# Patient Record
Sex: FEMALE | Race: WHITE | NOT HISPANIC OR LATINO
[De-identification: names, ages, dates, MRNs, and addresses within clinical notes are randomized per-mention and may not be internally consistent; named-entity substitution may affect disease eponyms.]

---

## 2020-07-01 PROBLEM — Z00.00 ENCOUNTER FOR PREVENTIVE HEALTH EXAMINATION: Status: ACTIVE | Noted: 2020-07-01

## 2020-07-07 ENCOUNTER — APPOINTMENT (OUTPATIENT)
Dept: NEUROLOGY | Facility: CLINIC | Age: 70
End: 2020-07-07
Payer: MEDICARE

## 2020-07-07 PROCEDURE — 99203 OFFICE O/P NEW LOW 30 MIN: CPT | Mod: 95

## 2020-07-07 RX ORDER — DONEPEZIL HYDROCHLORIDE 23 MG/1
TABLET, FILM COATED ORAL
Refills: 0 | Status: ACTIVE | COMMUNITY

## 2020-07-07 NOTE — PHYSICAL EXAM
[FreeTextEntry1] : Physical examination \par General: No acute distress, Awake, Alert.   \par \par Mental status \par Awake, alert, and oriented to person, time.  Language intact, Fund of knowledge intact.  \par 4Q$1, 7Q$1.75, Delayed recall with hints 3/3. \par \par \par VIII:  Decreased hearing, bilaterally. \par \par \par

## 2020-07-07 NOTE — REASON FOR VISIT
[Home] : at home, [unfilled] , at the time of the visit. [Medical Office: ([facility])___] : at the medical office located in  [Verbal consent obtained from patient] : the patient, [unfilled] [Family Member] : family member

## 2020-07-07 NOTE — HISTORY OF PRESENT ILLNESS
[FreeTextEntry1] : History obtained from patient and Niece Penny. \par \par Ms. Saxena is a 70 year old woman with a history of anxiety and depression.  She is being followed by psychiatry who prescribed Zoloft and recently prescribed Seroquel before bed.  The niece notes increase in anxiety especially at night with delusions and hallucinations; therefore, the Seroquel was needed.   Approximately one week ago she saw her psychiatrist who started Aricept. She has not taken her first dose yet. \par \par Ms. Saxena reports some infrequent problems with memory. She reports feeling okay overall. \par \par Her niece notes memory changes that have been a slow progression over the past 6-8 months approximately.  Ms. Saxena lives alone and is able to perform all of her IADLs.  She was never able to keep her own appointments, but this has recentlly worsened.  She misplaces items (i.e. misplacing stamps, or not placing utensils in proper place).  She denies any language difficulties.  She does not drive. She gets daily exercise and walks her dog.  Her house is not noted to be disheveled.\par   \par She has not seen a medical doctor in 40 years. She recently established with a PCP.\par \par The remaining neurological review of systems is negative.  \par \par Social history: No alcohol use. Quit smoking a few years ago.\par Family history: "vascular" issues.

## 2020-07-07 NOTE — ASSESSMENT
[FreeTextEntry1] : Falguni Saxena is a 70 year old woman with memory impairment.\par Differential diagnosis includes mild cognitive impairment, pseudodementia, and frontotemporal dementia. \par \par She was prescribed Donepezil by her psychiatrist.\par Serological workup for reversible causes-Vitamin B12, TSH\par MRI brain w/o to rule out structural lesions.\par Neuropsychological evaluation. \par Follow up in 6 weeks in office if comfortable.\par \par anxiety and depression-continue follow ups with psychiatry.  \par Follow up with PCP for full medical exam and workup.

## 2021-01-12 ENCOUNTER — APPOINTMENT (OUTPATIENT)
Dept: NEUROLOGY | Facility: CLINIC | Age: 71
End: 2021-01-12
Payer: MEDICARE

## 2021-01-12 VITALS — SYSTOLIC BLOOD PRESSURE: 196 MMHG | HEART RATE: 103 BPM | DIASTOLIC BLOOD PRESSURE: 103 MMHG

## 2021-01-12 VITALS — SYSTOLIC BLOOD PRESSURE: 146 MMHG | DIASTOLIC BLOOD PRESSURE: 83 MMHG | HEART RATE: 87 BPM

## 2021-01-12 PROCEDURE — 99215 OFFICE O/P EST HI 40 MIN: CPT

## 2021-01-21 NOTE — PHYSICAL EXAM
[FreeTextEntry1] : Physical examination \par General: No acute distress, Awake, Alert.   \par \par Mental status \par Awake, alert, and oriented to person.  Time (Jan 2020) and place (hospital, unsure which hospital), Normal attention span and concentration, Language intact, Fund of knowledge intact.   \par Delayed recall 1/3. \par \par Cranial Nerves \par II: VFF  \par III, IV, VI: PERRL, EOMI.   \par V: Facial sensation is normal B/L.   \par VII: Facial strength is normal B/L. \par \par \par VIII: Gross hearing is intact.   \par \par IX, X: Palate is midline and elevates symmetrically.   \par XI: Trapezius normal strength.   \par XII: Tongue midline without atrophy or fasciculations. \par \par Motor exam  \par Muscle tone - no evidence of rigidity or resistance in all 4 extremities.  \par No atrophy or fasciculations \par Muscle Strength: arms and legs, proximal and distal flexors and extensors are normal \par \par No UE drift.\par \par Reflexes \par Diffuse hyperreflexia.\par \par Plantars right: mute.   \par Plantars left: mute.   \par \par Absent Ankle Jerks. \par \par Coordination \par Finger to nose: Normal.  \par Heel to shin: Normal.   \par \par Sensory \par Decreased vibration left MM. \par Decreased vibration right great toe. \par Intact sensation to vibration and cold.\par \par Intact cold sensation. \par \par Gait \par Normal including heels, toes, and tandem gait.  \par \par Slow, wide based gait. \par \par

## 2021-01-21 NOTE — HISTORY OF PRESENT ILLNESS
[FreeTextEntry1] : History obtained from patient and Niece Penny.\par \par Ms. Saxena is a 70 year old woman with a history of anxiety and depression, hypertension, hyperlipidemia presenting for a follow up for memory difficulties.\par \par As per her niece she first noted a change two years ago.  One year ago she started having hallucinations, delusions, scared at times, and became frustrated. She currently lives alone. She follows with psychiatry and takes Seroquel 100mg and Zoloft 200mg which helps with symptoms.  She had recurrent symptoms in November.   \par She was COVID negative on 12/6/20. She was hospitalized at [facility] in November 2020. \par \par She had recent hallucinations 8 months ago and her parents also had hallucinations. \par \par Ms. Saxena does note some mild changes with her memory. She exercises daily and walks her dog on a daily basis. \par \par The remaining neurological review of systems is negative. \par \par July 7, 2020\par History obtained from patient and Niece Penny. \par \par Ms. Saxena is a 70 year old woman with a history of anxiety and depression.  She is being followed by psychiatry who prescribed Zoloft and recently prescribed Seroquel before bed.  The niece notes increase in anxiety especially at night with delusions and hallucinations; therefore, the Seroquel was needed.   Approximately one week ago she saw her psychiatrist who started Aricept. She has not taken her first dose yet. \par \par Ms. Saxena reports some infrequent problems with memory. She reports feeling okay overall. \par \par Her niece notes memory changes that have been a slow progression over the past 6-8 months approximately.  Ms. Saxena lives alone and is able to perform all of her IADLs.  She was never able to keep her own appointments, but this has recentlly worsened.  She misplaces items (i.e. misplacing stamps, or not placing utensils in proper place).  She denies any language difficulties.  She does not drive. She gets daily exercise and walks her dog.  Her house is not noted to be disheveled.\par   \par She has not seen a medical doctor in 40 years. She recently established with a PCP.\par \par The remaining neurological review of systems is negative.  \par \par Social history: No alcohol use. Quit smoking a few years ago.\par Family history: "vascular" issues.

## 2021-01-21 NOTE — DATA REVIEWED
[de-identified] : 12/10/20 MRI brain-mild findings of chronic small vessel ischemia/infarction. No MRI evidence of recent infarction. No evidence of recent or old hemorrhage.  [de-identified] : 12/11/20 MRA- mild stenosis at the origins of the internal carotid arteries. \par 12/6/20 TSH 1.95\par

## 2021-01-21 NOTE — CONSULT LETTER
[Dear  ___] : Dear  [unfilled], [Consult Letter:] : I had the pleasure of evaluating your patient, [unfilled]. [Please see my note below.] : Please see my note below. [Consult Closing:] : Thank you very much for allowing me to participate in the care of this patient.  If you have any questions, please do not hesitate to contact me. [Sincerely,] : Sincerely, [DrPravin  ___] : Dr. MINAYA [FreeTextEntry3] : Destinee Castaneda M.D.\par

## 2021-01-21 NOTE — ASSESSMENT
[FreeTextEntry1] : Falguni Saxena is a 70 year old woman with memory impairment.\par Differential diagnosis includes mild cognitive impairment, pseudodementia, and frontotemporal dementia. \par \par Continue Donepezil.\par Start Namenda with upward titration (see chart note)  \par Serological workup for reversible causes-Vitamin B12.\par MRI brain reviewed.\par Neuropsychological evaluation. \par \par Hallucinations, anxiety and depression-continue to follow up with psychiatry.  On Zoloft and Seroquel. \par \par Follow up in 3 months.

## 2021-03-29 ENCOUNTER — APPOINTMENT (OUTPATIENT)
Dept: NEUROLOGY | Facility: CLINIC | Age: 71
End: 2021-03-29

## 2021-08-25 ENCOUNTER — RX RENEWAL (OUTPATIENT)
Age: 71
End: 2021-08-25

## 2021-11-22 ENCOUNTER — RX RENEWAL (OUTPATIENT)
Age: 71
End: 2021-11-22

## 2021-11-26 ENCOUNTER — RX RENEWAL (OUTPATIENT)
Age: 71
End: 2021-11-26

## 2021-12-01 ENCOUNTER — APPOINTMENT (OUTPATIENT)
Dept: NEUROLOGY | Facility: CLINIC | Age: 71
End: 2021-12-01
Payer: MEDICARE

## 2021-12-01 DIAGNOSIS — R41.3 OTHER AMNESIA: ICD-10-CM

## 2021-12-01 PROCEDURE — 99212 OFFICE O/P EST SF 10 MIN: CPT | Mod: 95

## 2021-12-01 RX ORDER — AMLODIPINE BESYLATE 5 MG/1
TABLET ORAL
Refills: 0 | Status: ACTIVE | COMMUNITY

## 2021-12-01 RX ORDER — ATORVASTATIN CALCIUM 80 MG/1
TABLET, FILM COATED ORAL
Refills: 0 | Status: ACTIVE | COMMUNITY

## 2021-12-01 RX ORDER — QUETIAPINE 200 MG/1
200 TABLET, FILM COATED ORAL
Refills: 0 | Status: ACTIVE | COMMUNITY

## 2021-12-01 RX ORDER — MEMANTINE HYDROCHLORIDE 14 MG/1
14 CAPSULE, EXTENDED RELEASE ORAL
Qty: 7 | Refills: 0 | Status: DISCONTINUED | COMMUNITY
Start: 2021-01-12 | End: 2021-12-01

## 2021-12-01 RX ORDER — MEMANTINE HYDROCHLORIDE 7 MG/1
7 CAPSULE, EXTENDED RELEASE ORAL
Qty: 7 | Refills: 0 | Status: DISCONTINUED | COMMUNITY
Start: 2021-01-12 | End: 2021-12-01

## 2021-12-01 RX ORDER — SERTRALINE HYDROCHLORIDE 25 MG/1
TABLET, FILM COATED ORAL
Refills: 0 | Status: ACTIVE | COMMUNITY

## 2021-12-01 RX ORDER — MEMANTINE HYDROCHLORIDE 21 MG/1
21 CAPSULE, EXTENDED RELEASE ORAL
Qty: 7 | Refills: 0 | Status: DISCONTINUED | COMMUNITY
Start: 2021-01-12 | End: 2021-12-01

## 2021-12-01 NOTE — REASON FOR VISIT
[Home] : at home, [unfilled] , at the time of the visit. [Medical Office: ([facility])___] : at the medical office located in  [Verbal consent obtained from patient] : the patient, [unfilled]

## 2021-12-03 NOTE — CONSULT LETTER
[Dear  ___] : Dear  [unfilled], [Consult Letter:] : I had the pleasure of evaluating your patient, [unfilled]. [Please see my note below.] : Please see my note below. [Consult Closing:] : Thank you very much for allowing me to participate in the care of this patient.  If you have any questions, please do not hesitate to contact me. [Sincerely,] : Sincerely, [DrPravin  ___] : Dr. MINAYA [FreeTextEntry3] : Destinee Castaneda M.D.\par Zulma Bartlett N.P.\par

## 2021-12-03 NOTE — HISTORY OF PRESENT ILLNESS
[FreeTextEntry1] : History obtained from patient and niece Penny.\par \par She notes her memory is slightly better and stable since last visit. She is able to cook for herself. Her niece organizes her medications. Her sister pays the bills for the house. She is taking Aricept 10mg and Memantine 28mg daily. \par Ms. Saxena walks her dog multiple times during the day. She did not have her vitamin B12 checked and prefers to limit her blood work. She did not have neuropsychological evaluation completed. She lives alone with her dogs. \par \par She also follows with psychiatry and is taking Sertraline 200mg and Seroquel 150mg daily. \par \par The remaining neurological review of systems is negative. \par \par 1/12/21\par History obtained from patient and Niece Penny.\par \par Ms. Saxena is a 70 year old woman with a history of anxiety and depression, hypertension, hyperlipidemia presenting for a follow up for memory difficulties.\par \par As per her niece she first noted a change two years ago.  One year ago she started having hallucinations, delusions, scared at times, and became frustrated. She currently lives alone. She follows with psychiatry and takes Seroquel 100mg and Zoloft 200mg which helps with symptoms.  She had recurrent symptoms in November.   \par She was COVID negative on 12/6/20. She was hospitalized at [facility] in November 2020. \par \par She had recent hallucinations 8 months ago and her parents also had hallucinations. \par \par Ms. Saxena does note some mild changes with her memory. She exercises daily and walks her dog on a daily basis. \par \par The remaining neurological review of systems is negative. \par \par July 7, 2020\par History obtained from patient and Niece Penny. \par \par Ms. Saxena is a 70 year old woman with a history of anxiety and depression.  She is being followed by psychiatry who prescribed Zoloft and recently prescribed Seroquel before bed.  The niece notes increase in anxiety especially at night with delusions and hallucinations; therefore, the Seroquel was needed.   Approximately one week ago she saw her psychiatrist who started Aricept. She has not taken her first dose yet. \par \par Ms. Saxena reports some infrequent problems with memory. She reports feeling okay overall. \par \par Her niece notes memory changes that have been a slow progression over the past 6-8 months approximately.  Ms. Saxena lives alone and is able to perform all of her IADLs.  She was never able to keep her own appointments, but this has recentlly worsened.  She misplaces items (i.e. misplacing stamps, or not placing utensils in proper place).  She denies any language difficulties.  She does not drive. She gets daily exercise and walks her dog.  Her house is not noted to be disheveled.\par   \par She has not seen a medical doctor in 40 years. She recently established with a PCP.\par \par The remaining neurological review of systems is negative.  \par \par Social history: No alcohol use. Quit smoking a few years ago.\par Family history: "vascular" issues.

## 2021-12-03 NOTE — ASSESSMENT
[FreeTextEntry1] : Falguni Saxena is a 71 year old woman with memory impairment.\par Differential diagnosis includes mild cognitive impairment, pseudodementia, and frontotemporal dementia. \par \par Continue Donepezil 10 mg daily\par Continue Namenda 28mg daily. \par Serological workup for reversible causes-Vitamin B12 on next blood work.\par MRI brain reviewed previously with patient and family.\par Neuropsychological evaluation recommended if patient is able to tolerate testing.\par Will do [facility] at next in office visit.  \par \par Hallucinations, anxiety and depression-continue to follow up with psychiatry.  On Zoloft and Seroquel. \par \par Follow up in 6 months with NP.\par Follow up in one year with one visit with MD.\par

## 2021-12-03 NOTE — PHYSICAL EXAM
[FreeTextEntry1] : Physical examination \par General: No acute distress, Awake, Alert.   \par \par Mental status \par Awake, alert, and oriented to person.  Time, and place  Normal attention span and concentration, Language intact, Fund of knowledge intact with hints.\par \par Cranial Nerves \par   \par VII: Facial strength is normal B/L. \par \par \par VIII: Gross hearing is intact.   \par \par IX, X: Palate is midline and elevates symmetrically.   \par XII: Tongue midline without atrophy or fasciculations. \par \par \par Coordination \par Finger to nose: Normal.  \par \par \par Gait \par \par \par Slow, wide based gait. \par \par

## 2021-12-03 NOTE — DATA REVIEWED
[de-identified] : 12/10/20 MRI brain-mild findings of chronic small vessel ischemia/infarction. No MRI evidence of recent infarction. No evidence of recent or old hemorrhage.  [de-identified] : 12/11/20 MRA- mild stenosis at the origins of the internal carotid arteries. \par 12/6/20 TSH 1.95\par

## 2023-08-08 ENCOUNTER — NON-APPOINTMENT (OUTPATIENT)
Age: 73
End: 2023-08-08

## 2023-10-02 ENCOUNTER — RX RENEWAL (OUTPATIENT)
Age: 73
End: 2023-10-02

## 2023-10-02 RX ORDER — MEMANTINE HYDROCHLORIDE 28 MG/1
28 CAPSULE, EXTENDED RELEASE ORAL
Qty: 90 | Refills: 0 | Status: ACTIVE | COMMUNITY
Start: 2021-01-12 | End: 1900-01-01

## 2023-10-03 ENCOUNTER — APPOINTMENT (OUTPATIENT)
Dept: NEUROLOGY | Facility: CLINIC | Age: 73
End: 2023-10-03
Payer: MEDICARE

## 2023-10-03 ENCOUNTER — NON-APPOINTMENT (OUTPATIENT)
Age: 73
End: 2023-10-03

## 2023-10-03 VITALS — SYSTOLIC BLOOD PRESSURE: 133 MMHG | OXYGEN SATURATION: 97 % | HEART RATE: 54 BPM | DIASTOLIC BLOOD PRESSURE: 82 MMHG

## 2023-10-03 DIAGNOSIS — Z74.09 OTHER REDUCED MOBILITY: ICD-10-CM

## 2023-10-03 DIAGNOSIS — E55.9 VITAMIN D DEFICIENCY, UNSPECIFIED: ICD-10-CM

## 2023-10-03 PROCEDURE — 99215 OFFICE O/P EST HI 40 MIN: CPT

## 2024-04-02 ENCOUNTER — APPOINTMENT (OUTPATIENT)
Dept: NEUROLOGY | Facility: CLINIC | Age: 74
End: 2024-04-02

## 2025-08-01 ENCOUNTER — NON-APPOINTMENT (OUTPATIENT)
Age: 75
End: 2025-08-01